# Patient Record
Sex: MALE | Race: WHITE | NOT HISPANIC OR LATINO | URBAN - METROPOLITAN AREA
[De-identification: names, ages, dates, MRNs, and addresses within clinical notes are randomized per-mention and may not be internally consistent; named-entity substitution may affect disease eponyms.]

---

## 2017-05-17 ENCOUNTER — TELEPHONE (OUTPATIENT)
Dept: PULMONOLOGY | Facility: HOSPICE | Age: 82
End: 2017-05-17

## 2017-05-17 ENCOUNTER — HOSPITAL ENCOUNTER (OUTPATIENT)
Dept: RADIOLOGY | Facility: MEDICAL CENTER | Age: 82
End: 2017-05-17
Attending: NURSE PRACTITIONER
Payer: MEDICARE

## 2017-05-17 DIAGNOSIS — J44.9 CHRONIC OBSTRUCTIVE PULMONARY DISEASE, UNSPECIFIED COPD TYPE (HCC): ICD-10-CM

## 2017-05-17 DIAGNOSIS — J44.1 CHRONIC OBSTRUCTIVE PULMONARY DISEASE WITH ACUTE EXACERBATION (HCC): ICD-10-CM

## 2017-05-17 PROCEDURE — 71250 CT THORAX DX C-: CPT

## 2017-05-17 RX ORDER — PREDNISONE 10 MG/1
TABLET ORAL
Qty: 18 TAB | Refills: 0 | Status: SHIPPED | OUTPATIENT
Start: 2017-05-17

## 2017-05-17 RX ORDER — LEVOFLOXACIN 500 MG/1
500 TABLET, FILM COATED ORAL DAILY
Qty: 10 TAB | Refills: 0 | Status: SHIPPED | OUTPATIENT
Start: 2017-05-17 | End: 2017-06-02

## 2017-05-17 NOTE — TELEPHONE ENCOUNTER
RX for Levaquin and Prednisone taper now. Advise f/u in office to verify symptoms have resolved. If symptoms get worse in the next 24-48hrs, need UC/ER visit.

## 2017-05-17 NOTE — TELEPHONE ENCOUNTER
Caller Name:  Ramirez Ivette Pearson  Call Back Number: [phone] (home)      Patient approves a detailed voicemail message: yes    Patient's symptoms    Consititutional: fever and productive cough, fatigue    Fever: present, low grade, 100-101    Cough: productive of clear sputum    Is this a new symptom: Yes    Onset of symptom: yesterday    Frequency of symptom: continuous    Has the patient tried anything to resolve symptoms: Yes    Has the patient taken their nebulizer/rescue inhaler: yes    Additional details: Patient is asking for Prednisone    Action taken per Active Symptom guide: Pt is asking for Prednisone    Patient agrees to recommended action per active symptom guide

## 2017-05-17 NOTE — TELEPHONE ENCOUNTER
Called the pt and let him know that the abx and prednisone were called in.  I told him that he needs to follow up in the clinic and he said that he will call for an appt.  I also told him that if symptoms get worse that he would need to go to either UC or the ER.  He agreed to all of that.

## 2017-05-19 DIAGNOSIS — J44.9 CHRONIC OBSTRUCTIVE PULMONARY DISEASE, UNSPECIFIED COPD TYPE (HCC): ICD-10-CM

## 2017-05-19 NOTE — TELEPHONE ENCOUNTER
Patient said he did not receive antibiotic and prednisone.  He called [facility] yesterday.  Please look into this. TY

## 2017-06-02 ENCOUNTER — OFFICE VISIT (OUTPATIENT)
Dept: PULMONOLOGY | Facility: HOSPICE | Age: 82
End: 2017-06-02
Payer: MEDICARE

## 2017-06-02 VITALS
HEIGHT: 72 IN | RESPIRATION RATE: 16 BRPM | OXYGEN SATURATION: 93 % | DIASTOLIC BLOOD PRESSURE: 70 MMHG | TEMPERATURE: 98.4 F | HEART RATE: 97 BPM | SYSTOLIC BLOOD PRESSURE: 140 MMHG

## 2017-06-02 DIAGNOSIS — J44.9 CHRONIC OBSTRUCTIVE PULMONARY DISEASE, UNSPECIFIED COPD TYPE (HCC): ICD-10-CM

## 2017-06-02 DIAGNOSIS — R93.89 ABNORMAL RADIOLOGIC DENSITY: ICD-10-CM

## 2017-06-02 PROCEDURE — 99213 OFFICE O/P EST LOW 20 MIN: CPT | Performed by: NURSE PRACTITIONER

## 2017-06-02 PROCEDURE — G8421 BMI NOT CALCULATED: HCPCS | Performed by: NURSE PRACTITIONER

## 2017-06-02 PROCEDURE — G8432 DEP SCR NOT DOC, RNG: HCPCS | Performed by: NURSE PRACTITIONER

## 2017-06-02 PROCEDURE — 1101F PT FALLS ASSESS-DOCD LE1/YR: CPT | Mod: 8P | Performed by: NURSE PRACTITIONER

## 2017-06-02 PROCEDURE — 4040F PNEUMOC VAC/ADMIN/RCVD: CPT | Mod: 8P | Performed by: NURSE PRACTITIONER

## 2017-06-02 PROCEDURE — 1036F TOBACCO NON-USER: CPT | Performed by: NURSE PRACTITIONER

## 2017-06-02 RX ORDER — CEFUROXIME AXETIL 250 MG/1
500 TABLET ORAL 2 TIMES DAILY
Qty: 20 TAB | Refills: 1 | Status: SHIPPED | OUTPATIENT
Start: 2017-06-02

## 2017-06-02 NOTE — PATIENT INSTRUCTIONS
1. Continue Advair 250/50 µg twice daily and pro-air and albuterol nebulized treatments as needed.  2. Continue oxygen at 3 L/m.  3. CT scan in 3 months for new 7.4 mm right upper lobe possible small groundglass infiltrate. If stable this will need to be followed annually through May 2021.

## 2017-08-02 ENCOUNTER — OFFICE VISIT (OUTPATIENT)
Dept: PULMONOLOGY | Facility: HOSPICE | Age: 82
End: 2017-08-02
Payer: MEDICARE

## 2017-08-02 VITALS
TEMPERATURE: 98.1 F | BODY MASS INDEX: 27.5 KG/M2 | HEIGHT: 72 IN | RESPIRATION RATE: 16 BRPM | SYSTOLIC BLOOD PRESSURE: 130 MMHG | WEIGHT: 203 LBS | HEART RATE: 96 BPM | OXYGEN SATURATION: 93 % | DIASTOLIC BLOOD PRESSURE: 70 MMHG

## 2017-08-02 DIAGNOSIS — J44.9 CHRONIC OBSTRUCTIVE PULMONARY DISEASE, UNSPECIFIED COPD TYPE (HCC): ICD-10-CM

## 2017-08-02 DIAGNOSIS — R93.89 ABNORMAL RADIOLOGIC DENSITY: ICD-10-CM

## 2017-08-02 PROCEDURE — 99213 OFFICE O/P EST LOW 20 MIN: CPT | Performed by: NURSE PRACTITIONER

## 2017-08-02 PROCEDURE — 94761 N-INVAS EAR/PLS OXIMETRY MLT: CPT | Performed by: NURSE PRACTITIONER

## 2017-08-02 RX ORDER — PREDNISONE 10 MG/1
TABLET ORAL
Qty: 30 TAB | Refills: 2 | Status: SHIPPED | OUTPATIENT
Start: 2017-08-02

## 2017-08-02 NOTE — PROCEDURES
"Durable Medical Equipment-Specific Vitals  Vitals  Blood Pressure : 130/70 mmHg  Temperature: 36.7 °C (98.1 °F)  Pulse: 96  Respiration: 16  Pulse Oximetry: 93 %  Height: 182.9 cm (6' 0.01\")  Weight: 92.08 kg (203 lb)  DME  Room air sat at rest: 93  Room air sat with amb: 87  O2 sat at rest with O2: 97  With liters of O2: 3lpm  O2 sat with amb with O2 : 94  With Liters of O2: 3lpm        "

## 2017-08-02 NOTE — PATIENT INSTRUCTIONS
1. Continue Advair 250/50 µg one inhalation twice daily and pro-air and albuterol nebulized treatments as needed.  2. [place] for Aracelis 2.5 µg 2 inhalations daily.  3. Continue oxygen at 3 L/m.  4. Order for prednisone 30 mg daily, decrease by 10 mg every 5 days to have on hand for quick treatment of shortness of breath and wheezing.   5. Patient is due for CT in one month with follow-up visit.

## 2017-08-02 NOTE — MR AVS SNAPSHOT
"        Ramirez Steele Layo   2017 8:20 AM   Office Visit   MRN: [id number]    Department:  [facility]   Dept Phone:  [phone]    Description:  Male : 1935   Provider:  RICCARDO Santizo           Reason for Visit     COPD Requal for O2 per Medicare      Allergies as of 2017     Allergen Noted Reactions    Sulfa Drugs 2016         You were diagnosed with     Abnormal radiologic density   [110583]       Chronic obstructive pulmonary disease, unspecified COPD type (CMS-HCC)   [5134322]         Vital Signs     Blood Pressure Pulse Temperature Respirations Height Weight    130/70 mmHg 96 36.7 °C (98.1 °F) 16 1.829 m (6' 0.01\") 92.08 kg (203 lb)    Body Mass Index Oxygen Saturation Smoking Status             27.53 kg/m2 93% Former Smoker         Basic Information     Date Of Birth Sex Race Ethnicity Preferred Language    1935 Male White Non- English      Your appointments     Sep 14, 2017  1:20 PM   Established Patient Pul with RICCARDO Santizo   [facility] (--)    [address] [facility] [address]  [facility] [id number] [phone]              Problem List              ICD-10-CM Priority Class Noted - Resolved    COPD (chronic obstructive pulmonary disease) (CMS-HCC) J44.9   2016 - Present    Abnormal radiologic density R93.8   2016 - Present      Health Maintenance        Date Due Completion Dates    IMM DTaP/Tdap/Td Vaccine (1 - Tdap) 1955 ---    COLONOSCOPY 1986 ---    IMM ZOSTER VACCINE 1996 ---    IMM PNEUMOCOCCAL 65+ (ADULT) LOW/MEDIUM RISK SERIES (1 of 2 - PCV13) 2001 ---    IMM INFLUENZA (1) 2017 10/1/2016            Current Immunizations     Influenza TIV (IM) 10/1/2016    Pneumococcal Vaccine (PCV7) Historical Data 10/1/2016      Below and/or attached are the medications your provider expects you to take. Review all of your home medications and newly ordered medications with your provider and/or " pharmacist. Follow medication instructions as directed by your provider and/or pharmacist. Please keep your medication list with you and share with your provider. Update the information when medications are discontinued, doses are changed, or new medications (including over-the-counter products) are added; and carry medication information at all times in the event of emergency situations     Allergies:  SULFA DRUGS - (reactions not documented)               Medications  Valid as of: August 02, 2017 -  9:05 AM    Generic Name Brand Name Tablet Size Instructions for use    Albuterol Sulfate (Nebu Soln) PROVENTIL 2.5mg/3ml 2.5 mg by Nebulization route every four hours as needed for Shortness of Breath.        Albuterol Sulfate (Aero Soln) albuterol 108 (90 BASE) MCG/ACT Inhale 2 Puffs by mouth every four hours as needed for Shortness of Breath.        Cefuroxime Axetil (Tab) CEFTIN 250 MG Take 2 Tabs by mouth 2 times a day.        Fluticasone-Salmeterol (AEROSOL POWDER, BREATH ACTIVATED) ADVAIR 250-50 MCG/DOSE Inhale 1 Puff by mouth 2 times a day. Rinse mouth after each use.        Phenytoin Sodium Extended (Cap) DILANTIN 100 MG Take 100 mg by mouth. 3 caps by mouth nightly        PredniSONE (Tab) DELTASONE 10 MG Take 30mg x 5 days, then take 20mg x 5 days, then take 10mg x 5 days, with food, then discontinue.        PredniSONE (Tab) DELTASONE 10 MG Take 30mg x 3 days, then take 20mg x 3 days, then take 10mg x 3 days, with food, then discontinue.        PredniSONE (Tab) DELTASONE 10 MG Take 30mg x 5 days, then take 20mg x 5 days, then take 10mg x 5 days, with food, then discontinue.        Simvastatin (Tab) ZOCOR 20 MG Take 10 mg by mouth every evening.        Tiotropium Bromide Monohydrate (Aero Soln) Tiotropium Bromide Monohydrate 2.5 MCG/ACT Inhale 2 Inhalation by mouth every day.        Warfarin Sodium (Tab) COUMADIN 5 MG Take 5 mg by mouth every day.        .                 Medicines prescribed today were sent  to:     [facility]  [place] - [address]    [address] [place] [address]    Phone: [phone] Fax: [phone]    Open 24 Hours?: No    [facility] - [address]    [address] [place] [address]    Phone: [phone] Fax: [phone]    Open 24 Hours?: No      Medication refill instructions:       If your prescription bottle indicates you have medication refills left, it is not necessary to call your provider’s office. Please contact your pharmacy and they will refill your medication.    If your prescription bottle indicates you do not have any refills left, you may request refills at any time through one of the following ways: The online Kinopto system (except Urgent Care), by calling your provider’s office, or by asking your pharmacy to contact your provider’s office with a refill request. Medication refills are processed only during regular business hours and may not be available until the next business day. Your provider may request additional information or to have a follow-up visit with you prior to refilling your medication.   *Please Note: Medication refills are assigned a new Rx number when refilled electronically. Your pharmacy may indicate that no refills were authorized even though a new prescription for the same medication is available at the pharmacy. Please request the medicine by name with the pharmacy before contacting your provider for a refill.        Your To Do List     Future Labs/Procedures Complete By Expires    AMB MULTIPLE OXIMETRY  As directed 8/2/2018      Instructions    1. Continue Advair 250/50 µg one inhalation twice daily and pro-air and albuterol nebulized treatments as needed.  2. [place] for Aracelis 2.5 µg 2 inhalations daily.  3. Continue oxygen at 3 L/m.  4. Order for prednisone 30 mg daily, decrease by 10 mg every 5 days to have on hand for quick treatment of shortness of breath and  wheezing.   5. Patient is due for CT in one month with follow-up visit.            ValuNet Access Code: [email or web address]  Expires: 8/26/2017 11:47 AM    ValuNet  A secure, online tool to manage your health information     [employer]’s ValuNet® is a secure, online tool that connects you to your personalized health information from the privacy of your home -- day or night - making it very easy for you to manage your healthcare. Once the activation process is completed, you can even access your medical information using the ValuNet tameka, which is available for free in the Apple Tameka store or Google Play store.     ValuNet provides the following levels of access (as shown below):   My Chart Features   [facility]  Specialists [facility]  Urgent  Care Non-[facility]  Primary Care  Doctor   Email your healthcare team securely and privately 24/7 X X X    Manage appointments: schedule your next appointment; view details of past/upcoming appointments X      Request prescription refills. X      View recent personal medical records, including lab and immunizations X X X X   View health record, including health history, allergies, medications X X X X   Read reports about your outpatient visits, procedures, consult and ER notes X X X X   See your discharge summary, which is a recap of your hospital and/or ER visit that includes your diagnosis, lab results, and care plan. X X       How to register for ValuNet:  1. Go to  [email or web address].  2. Click on the Sign Up Now box, which takes you to the New Member Sign Up page. You will need to provide the following information:  a. Enter your ValuNet Access Code exactly as it appears at the top of this page. (You will not need to use this code after you’ve completed the sign-up process. If you do not sign up before the expiration date, you must request a new code.)   b. Enter your date of birth.   c. Enter your home email address.   d. Click Submit, and follow  the next screen’s instructions.  3. Create a PromoJam ID. This will be your PromoJam login ID and cannot be changed, so think of one that is secure and easy to remember.  4. Create a eSoftt password. You can change your password at any time.  5. Enter your Password Reset Question and Answer. This can be used at a later time if you forget your password.   6. Enter your e-mail address. This allows you to receive e-mail notifications when new information is available in PromoJam.  7. Click Sign Up. You can now view your health information.    For assistance activating your PromoJam account, call [phone]

## 2017-08-02 NOTE — PROGRESS NOTES
Chief Complaint   Patient presents with   • COPD     Requal for O2 per Medicare         HPI:  This is a 82 y.o. male with a history of chronic obstructive pulmonary disease. Pulmonary function tests from July 2015 indicated FEV1 0.99 L, 33% predicted, FEV1/FVC 39%, and DLCO 36% predicted. The patient is compliant with Advair 250/50 µg twice daily and pro-air and albuterol nebulized treatments as needed. The patient is oxygen dependent at 3 L/m. room air saturation was 87% today. He recovered on 3 L to 97% at rest and 94% with exertion. He will continue to benefit from oxygen therapy. He has been doing quite well up until today. He has a significant increase in shortness of breath but relates it to her heat and smoke. He denies fevers, chills, sweats, and hemoptysis.    The patient has a history of abnormal CT scan for a 9 mm pleural-based nodule in the medial left upper lobe. Follow-up CT dated 11/26/16 indicates a new 5 mm left upper lobe noncalcified pulmonary nodule, similar lingular atelectasis/scarring, lesser similar opacity in the middle lobe which was previously called a 9 mm nodule, severe emphysema. CT dated 5/17/17 indicates the previously described new 5 mm left upper lobe nodule is no longer seen but there is a new 7.4 mm subtle opacity in the posterior right upper lobe which could be small groundglass infiltrate.    Past Medical History   Diagnosis Date   • Appendicitis    • Tonsillitis    • Prostate cancer (CMS-HCC)    • Pulmonary embolism (CMS-HCC)    • Dyslipidemia    • COPD (chronic obstructive pulmonary disease) (CMS-HCC)    • Allergic rhinitis    • Seizure disorder (CMS-HCC)    • Cough    • COPD (chronic obstructive pulmonary disease) (CMS-HCC) 9/30/2016   • Abnormal radiologic density 12/1/2016       Past Surgical History   Procedure Laterality Date   • Appendectomy     • Tonsillectomy     • Vasectomy         Social History   Substance Use Topics   • Smoking status: Former Smoker -- 3.00  "packs/day     Quit date: 01/01/1991   • Smokeless tobacco: Never Used   • Alcohol Use: No       ROS:   Constitutional: Denies fevers, chills, sweats, fatigue, and weight loss.  Eyes: Denies glasses.  Ears/nose/mouth/throat: Denies injury.  Cardiovascular: Denies chest pain, tightness.  Respiratory: See history of present illness.  GI: Denies heartburn, difficulty swallowing, nausea, and vomiting.  Neurological: Denies frequent headaches, dizziness, weakness.    Vitals:  Filed Vitals:    08/02/17 0829   Height: 1.829 m (6' 0.01\")   Weight: 92.08 kg (203 lb)   Weight % change since last entry.: 0 %   BP: 130/70   Pulse: 96   BMI (Calculated): 27.53   Resp: 16   Temp: 36.7 °C (98.1 °F)   O2 sat % room air: 93 %       Allergies:  Sulfa drugs    Medications:  Current Outpatient Prescriptions   Medication Sig Dispense Refill   • predniSONE (DELTASONE) 10 MG Tab Take 30mg x 5 days, then take 20mg x 5 days, then take 10mg x 5 days, with food, then discontinue. 30 Tab 2   • Tiotropium Bromide Monohydrate (SPIRIVA RESPIMAT) 2.5 MCG/ACT Aero Soln Inhale 2 Inhalation by mouth every day. 1 Inhaler 5   • cefUROXime (CEFTIN) 250 MG Tab Take 2 Tabs by mouth 2 times a day. 20 Tab 1   • predniSONE (DELTASONE) 10 MG Tab Take 30mg x 3 days, then take 20mg x 3 days, then take 10mg x 3 days, with food, then discontinue. 18 Tab 0   • simvastatin (ZOCOR) 20 MG Tab Take 10 mg by mouth every evening.     • warfarin (COUMADIN) 5 MG Tab Take 5 mg by mouth every day.     • predniSONE (DELTASONE) 10 MG Tab Take 30mg x 5 days, then take 20mg x 5 days, then take 10mg x 5 days, with food, then discontinue. 30 Tab 2   • phenytoin ER (DILANTIN) 100 MG Cap Take 100 mg by mouth. 3 caps by mouth nightly     • albuterol (PROVENTIL) 2.5mg/3ml Nebu Soln solution for nebulization 2.5 mg by Nebulization route every four hours as needed for Shortness of Breath.     • albuterol (PROAIR HFA) 108 (90 BASE) MCG/ACT Aero Soln inhalation aerosol Inhale 2 Puffs by " mouth every four hours as needed for Shortness of Breath.     • fluticasone-salmeterol (ADVAIR DISKUS) 250-50 MCG/DOSE AEROSOL POWDER, BREATH ACTIVATED Inhale 1 Puff by mouth 2 times a day. Rinse mouth after each use.       No current facility-administered medications for this visit.       PHYSICAL EXAM:  Appearance: Well-developed, well-nourished, no acute distress.  Eyes. PERRL.  Hearing: Grossly intact.  Oropharynx: Tongue normal, posterior pharynx without erythema or exudate.  Respiratory effort: No intercostal retractions or use of accessory muscles.  Lung auscultation: No crackles, wheezing.  Heart auscultation: No murmur, gallop, or rub. Regular rate and rhythm.  Extremities: No cyanosis or edema.  Gait and Station: Wheelchair bound.  Orientation: Oriented to time, place, and person.    Assessment:  1. Abnormal radiologic density     2. Chronic obstructive pulmonary disease, unspecified COPD type (CMS-HCC)  AMB MULTIPLE OXIMETRY    predniSONE (DELTASONE) 10 MG Tab    Tiotropium Bromide Monohydrate (SPIRIVA RESPIMAT) 2.5 MCG/ACT Aero Soln    DME O2 NEW SET UP    AMB MULTIPLE OXIMETRY         Plan:  1. Continue Advair 250/50 µg one inhalation twice daily and pro-air and albuterol nebulized treatments as needed.  2. [place] Spiriva 2.5 µg 2 inhalations daily.  3. Continue oxygen at 3 L/m.  4. Order for prednisone 30 mg daily, decrease by 10 mg every 5 days to have on hand for quick treatment of shortness of breath and wheezing.  5. Patient is due for CT in one month with follow-up visit.    Return in about 6 weeks (around 9/13/2017) for After CT/MRI, With ANIBAL Law.

## 2017-08-17 ENCOUNTER — HOSPITAL ENCOUNTER (OUTPATIENT)
Dept: RADIOLOGY | Facility: MEDICAL CENTER | Age: 82
End: 2017-08-17
Attending: NURSE PRACTITIONER
Payer: MEDICARE

## 2017-08-17 DIAGNOSIS — R93.89 ABNORMAL RADIOLOGIC DENSITY: ICD-10-CM

## 2017-08-17 PROCEDURE — 71250 CT THORAX DX C-: CPT

## 2017-09-05 ENCOUNTER — TELEPHONE (OUTPATIENT)
Dept: PULMONOLOGY | Facility: HOSPICE | Age: 82
End: 2017-09-05

## 2017-09-05 NOTE — TELEPHONE ENCOUNTER
Stable changes on CT scan, but needs to discuss results further at upcoming appt with Araceli Correa for repeat imaging in the future to continue to monitor

## 2017-09-06 NOTE — TELEPHONE ENCOUNTER
I called patient with LINDSEYN's reportings: patient has appt on the 14th with Araceli HERNANDEZ for review and follow up.

## 2017-09-14 ENCOUNTER — OFFICE VISIT (OUTPATIENT)
Dept: PULMONOLOGY | Facility: HOSPICE | Age: 82
End: 2017-09-14
Payer: MEDICARE

## 2017-09-14 VITALS
HEART RATE: 66 BPM | OXYGEN SATURATION: 97 % | TEMPERATURE: 97.7 F | BODY MASS INDEX: 26.51 KG/M2 | HEIGHT: 73 IN | RESPIRATION RATE: 16 BRPM | WEIGHT: 200 LBS | DIASTOLIC BLOOD PRESSURE: 72 MMHG | SYSTOLIC BLOOD PRESSURE: 120 MMHG

## 2017-09-14 DIAGNOSIS — R93.89 ABNORMAL RADIOLOGIC DENSITY: ICD-10-CM

## 2017-09-14 DIAGNOSIS — J44.9 CHRONIC OBSTRUCTIVE PULMONARY DISEASE, UNSPECIFIED COPD TYPE (HCC): ICD-10-CM

## 2017-09-14 PROCEDURE — 99214 OFFICE O/P EST MOD 30 MIN: CPT | Performed by: NURSE PRACTITIONER

## 2017-09-14 NOTE — PATIENT INSTRUCTIONS
1. CT chest in 6 months for further evaluation of 7.4 mm right upper lobe groundglass opacity.  2. Continue Advair 500/50 µg twice daily, Spiriva daily, and Prilosec and albuterol nebulized treatments as needed.  3. Continue oxygen at 3 L/m.

## 2017-09-14 NOTE — PROGRESS NOTES
Chief Complaint   Patient presents with   • Follow-Up     CT results         HPI:  This is a 82 y.o. male with a history of chronic obstructive pulmonary disease. Pulmonary function tests from July 2015 indicated FEV1 0.99 L, 33% predicted, FEV1/FVC 39%, and DLCO 36% predicted. The patient is compliant with Advair 500/50 µg twice daily, Spiriva daily, and pro-air and albuterol nebulized treatments as needed. The patient is oxygen dependent at 3 L/m.     The patient has a history of abnormal CT scan. Most recent CT dated 5/17/17 indicated a new 7.4 mm subtle opacity in the posterior writer for low which could be groundglass infiltrate. CT dated 8/17/17 indicate stability of 7.4 mm groundglass opacity in the posterior aspect of the right upper lobe. Previous 5 mm nodule in the left upper lobe had resolved in May 2017. The patient's CT will need to be followed every 6 months through May 2019 and beyond due to it being a groundglass opacity. Patient reports his pulmonary status is stable. He denies fevers, chills, sweats, and hemoptysis.    Past Medical History:   Diagnosis Date   • Abnormal radiologic density 12/1/2016   • COPD (chronic obstructive pulmonary disease) (CMS-formerly [facility]) 9/30/2016   • Allergic rhinitis    • Appendicitis    • COPD (chronic obstructive pulmonary disease) (CMS-HCC)    • Cough    • Dyslipidemia    • Prostate cancer (CMS-HCC)    • Pulmonary embolism (CMS-HCC)    • Seizure disorder (CMS-HCC)    • Tonsillitis        Past Surgical History:   Procedure Laterality Date   • APPENDECTOMY     • TONSILLECTOMY     • VASECTOMY         Social History   Substance Use Topics   • Smoking status: Former Smoker     Packs/day: 3.00     Quit date: 1/1/1991   • Smokeless tobacco: Never Used   • Alcohol use No       ROS:   Constitutional: Denies fevers, chills, sweats, fatigue, and weight loss.  Eyes: Denies glasses.  Ears/nose/mouth/throat: Denies injury.  Cardiovascular: Denies chest pain, tightness.  Respiratory: See  "history of present illness.  GI: Denies heartburn, difficulty swallowing, nausea, and vomiting.  Neurological: Denies frequent headaches, dizziness, weakness.    Vitals:  Vitals:    09/14/17 1320   Height: 1.854 m (6' 1\")   Weight: 90.7 kg (200 lb)   Weight % change since last entry.: 0 %   BP: 120/72   Pulse: 66   BMI (Calculated): 26.39   Resp: 16   Temp: 36.5 °C (97.7 °F)   O2 sat % on O2: 97 %       Allergies:  Sulfa drugs    Medications:  Current Outpatient Prescriptions   Medication Sig Dispense Refill   • fluticasone-salmeterol (ADVAIR DISKUS) 500-50 MCG/DOSE AEROSOL POWDER, BREATH ACTIVATED Inhale 1 Puff by mouth 2 times a day. Rinse mouth after each use. 1 Inhaler 5   • predniSONE (DELTASONE) 10 MG Tab Take 30mg x 5 days, then take 20mg x 5 days, then take 10mg x 5 days, with food, then discontinue. 30 Tab 2   • Tiotropium Bromide Monohydrate (SPIRIVA RESPIMAT) 2.5 MCG/ACT Aero Soln Inhale 2 Inhalation by mouth every day. 1 Inhaler 5   • cefUROXime (CEFTIN) 250 MG Tab Take 2 Tabs by mouth 2 times a day. 20 Tab 1   • predniSONE (DELTASONE) 10 MG Tab Take 30mg x 3 days, then take 20mg x 3 days, then take 10mg x 3 days, with food, then discontinue. 18 Tab 0   • simvastatin (ZOCOR) 20 MG Tab Take 10 mg by mouth every evening.     • warfarin (COUMADIN) 5 MG Tab Take 5 mg by mouth every day.     • predniSONE (DELTASONE) 10 MG Tab Take 30mg x 5 days, then take 20mg x 5 days, then take 10mg x 5 days, with food, then discontinue. 30 Tab 2   • phenytoin ER (DILANTIN) 100 MG Cap Take 100 mg by mouth. 3 caps by mouth nightly     • albuterol (PROVENTIL) 2.5mg/3ml Nebu Soln solution for nebulization 2.5 mg by Nebulization route every four hours as needed for Shortness of Breath.     • albuterol (PROAIR HFA) 108 (90 BASE) MCG/ACT Aero Soln inhalation aerosol Inhale 2 Puffs by mouth every four hours as needed for Shortness of Breath.       No current facility-administered medications for this visit.        PHYSICAL " EXAM:  Appearance: Well-developed, well-nourished, no acute distress.  Eyes. PERRL.  Hearing: Grossly intact.  Oropharynx: Tongue normal, posterior pharynx without erythema or exudate.  Respiratory effort: No intercostal retractions or use of accessory muscles.  Lung auscultation: No crackles, wheezing.  Heart auscultation: No murmur, gallop, or rub. Regular rate and rhythm.  Extremities: No cyanosis or edema.  Gait and Station: Normal  Orientation: Oriented to time, place, and person.    Assessment:  1. Chronic obstructive pulmonary disease, unspecified COPD type (CMS-HCC)     2. Abnormal radiologic density  CT-CHEST (THORAX) W/O         Plan:  1. CT chest in 6 months for further evaluation of 7.4 mm right upper lobe groundglass opacity.  2. Continue Advair 500/50 µg twice daily, Spiriva daily, and Prilosec and albuterol nebulized treatments as needed.  3. Continue oxygen at 3 L/m.    Return in about 6 months (around 3/14/2018) for After CT/MRI, With ANIBAL Law.    I spent 45 minutes with over 50% of the time in face-to-face contact with the patient and family discussing CT scan, progression of COPD, and his medical regimen.

## 2018-01-02 ENCOUNTER — TELEPHONE (OUTPATIENT)
Dept: PULMONOLOGY | Facility: HOSPICE | Age: 83
End: 2018-01-02

## 2018-01-02 DIAGNOSIS — J44.9 CHRONIC OBSTRUCTIVE PULMONARY DISEASE, UNSPECIFIED COPD TYPE (HCC): ICD-10-CM

## 2018-01-02 NOTE — TELEPHONE ENCOUNTER
1. Caller Name: Patient                      Call Back Number: [phone] (home)     2. Message: Patient called and he would like an order for a Nebulizer sent to [facility]. Patient would like a call back once it is completed.    His  at [place] is Judy at ext [phone].    3. Patient approves office to leave a detailed voicemail/MyChart message: N\A

## 2018-11-02 ENCOUNTER — HOSPITAL ENCOUNTER (OUTPATIENT)
Dept: HOSPITAL 8 - CFH | Age: 83
Discharge: HOME | End: 2018-11-02
Attending: INTERNAL MEDICINE
Payer: MEDICARE

## 2018-11-02 DIAGNOSIS — J43.2: Primary | ICD-10-CM

## 2018-11-02 DIAGNOSIS — R91.8: ICD-10-CM

## 2018-11-02 PROCEDURE — 71250 CT THORAX DX C-: CPT

## 2018-11-15 ENCOUNTER — HOSPITAL ENCOUNTER (OUTPATIENT)
Dept: HOSPITAL 8 - PETCFH | Age: 83
Discharge: HOME | End: 2018-11-15
Attending: INTERNAL MEDICINE
Payer: MEDICARE

## 2018-11-15 DIAGNOSIS — Z02.9: Primary | ICD-10-CM

## 2018-11-19 ENCOUNTER — HOSPITAL ENCOUNTER (OUTPATIENT)
Dept: HOSPITAL 8 - PETCFH | Age: 83
Discharge: HOME | End: 2018-11-19
Attending: INTERNAL MEDICINE
Payer: MEDICARE

## 2018-11-19 DIAGNOSIS — D35.01: ICD-10-CM

## 2018-11-19 DIAGNOSIS — J44.1: ICD-10-CM

## 2018-11-19 DIAGNOSIS — R91.1: Primary | ICD-10-CM

## 2018-11-19 PROCEDURE — A9552 F18 FDG: HCPCS

## 2018-11-19 PROCEDURE — 78815 PET IMAGE W/CT SKULL-THIGH: CPT

## 2021-01-14 DIAGNOSIS — Z23 NEED FOR VACCINATION: ICD-10-CM
